# Patient Record
Sex: FEMALE | Race: WHITE | NOT HISPANIC OR LATINO | Employment: OTHER | URBAN - METROPOLITAN AREA
[De-identification: names, ages, dates, MRNs, and addresses within clinical notes are randomized per-mention and may not be internally consistent; named-entity substitution may affect disease eponyms.]

---

## 2023-08-08 ENCOUNTER — HOSPITAL ENCOUNTER (EMERGENCY)
Facility: HOSPITAL | Age: 78
Discharge: HOME | End: 2023-08-09
Attending: EMERGENCY MEDICINE | Admitting: EMERGENCY MEDICINE
Payer: MEDICARE

## 2023-08-08 ENCOUNTER — APPOINTMENT (OUTPATIENT)
Dept: CT IMAGING | Facility: HOSPITAL | Age: 78
End: 2023-08-08
Payer: MEDICARE

## 2023-08-08 ENCOUNTER — APPOINTMENT (OUTPATIENT)
Dept: GENERAL RADIOLOGY | Facility: HOSPITAL | Age: 78
End: 2023-08-08
Payer: MEDICARE
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
## 2023-08-08 DIAGNOSIS — I50.9 ACUTE CONGESTIVE HEART FAILURE, UNSPECIFIED HEART FAILURE TYPE: ICD-10-CM

## 2023-08-08 DIAGNOSIS — J44.1 COPD WITH ACUTE EXACERBATION: ICD-10-CM

## 2023-08-08 DIAGNOSIS — R07.9 CHEST PAIN, UNSPECIFIED TYPE: Primary | ICD-10-CM

## 2023-08-08 LAB
ALBUMIN SERPL-MCNC: 3.7 G/DL (ref 3.5–5.2)
ALBUMIN/GLOB SERPL: 1.4 G/DL
ALP SERPL-CCNC: 91 U/L (ref 39–117)
ALT SERPL W P-5'-P-CCNC: 114 U/L (ref 1–33)
ANION GAP SERPL CALCULATED.3IONS-SCNC: 11 MMOL/L (ref 5–15)
AST SERPL-CCNC: 85 U/L (ref 1–32)
BASOPHILS # BLD AUTO: 0.03 10*3/MM3 (ref 0–0.2)
BASOPHILS NFR BLD AUTO: 0.3 % (ref 0–1.5)
BILIRUB SERPL-MCNC: 0.7 MG/DL (ref 0–1.2)
BUN SERPL-MCNC: 10 MG/DL (ref 8–23)
BUN/CREAT SERPL: 13.9 (ref 7–25)
CALCIUM SPEC-SCNC: 9.3 MG/DL (ref 8.6–10.5)
CHLORIDE SERPL-SCNC: 98 MMOL/L (ref 98–107)
CO2 SERPL-SCNC: 29 MMOL/L (ref 22–29)
CREAT SERPL-MCNC: 0.72 MG/DL (ref 0.57–1)
DEPRECATED RDW RBC AUTO: 45.5 FL (ref 37–54)
EGFRCR SERPLBLD CKD-EPI 2021: 86.8 ML/MIN/1.73
EOSINOPHIL # BLD AUTO: 0.11 10*3/MM3 (ref 0–0.4)
EOSINOPHIL NFR BLD AUTO: 1.2 % (ref 0.3–6.2)
ERYTHROCYTE [DISTWIDTH] IN BLOOD BY AUTOMATED COUNT: 13.3 % (ref 12.3–15.4)
GEN 5 2HR TROPONIN T REFLEX: 44 NG/L
GLOBULIN UR ELPH-MCNC: 2.7 GM/DL
GLUCOSE SERPL-MCNC: 117 MG/DL (ref 65–99)
HCT VFR BLD AUTO: 34.9 % (ref 34–46.6)
HGB BLD-MCNC: 11.5 G/DL (ref 12–15.9)
HOLD SPECIMEN: NORMAL
HOLD SPECIMEN: NORMAL
IMM GRANULOCYTES # BLD AUTO: 0.03 10*3/MM3 (ref 0–0.05)
IMM GRANULOCYTES NFR BLD AUTO: 0.3 % (ref 0–0.5)
LIPASE SERPL-CCNC: 33 U/L (ref 13–60)
LYMPHOCYTES # BLD AUTO: 1.66 10*3/MM3 (ref 0.7–3.1)
LYMPHOCYTES NFR BLD AUTO: 17.7 % (ref 19.6–45.3)
MCH RBC QN AUTO: 31 PG (ref 26.6–33)
MCHC RBC AUTO-ENTMCNC: 33 G/DL (ref 31.5–35.7)
MCV RBC AUTO: 94.1 FL (ref 79–97)
MONOCYTES # BLD AUTO: 0.97 10*3/MM3 (ref 0.1–0.9)
MONOCYTES NFR BLD AUTO: 10.4 % (ref 5–12)
NEUTROPHILS NFR BLD AUTO: 6.57 10*3/MM3 (ref 1.7–7)
NEUTROPHILS NFR BLD AUTO: 70.1 % (ref 42.7–76)
NRBC BLD AUTO-RTO: 0 /100 WBC (ref 0–0.2)
NT-PROBNP SERPL-MCNC: 6818 PG/ML (ref 0–1800)
PLATELET # BLD AUTO: 251 10*3/MM3 (ref 140–450)
PMV BLD AUTO: 9.6 FL (ref 6–12)
POTASSIUM SERPL-SCNC: 4.4 MMOL/L (ref 3.5–5.2)
PROT SERPL-MCNC: 6.4 G/DL (ref 6–8.5)
RBC # BLD AUTO: 3.71 10*6/MM3 (ref 3.77–5.28)
SODIUM SERPL-SCNC: 138 MMOL/L (ref 136–145)
TROPONIN T DELTA: -2 NG/L
TROPONIN T SERPL HS-MCNC: 46 NG/L
WBC NRBC COR # BLD: 9.37 10*3/MM3 (ref 3.4–10.8)
WHOLE BLOOD HOLD COAG: NORMAL
WHOLE BLOOD HOLD SPECIMEN: NORMAL

## 2023-08-08 PROCEDURE — 71045 X-RAY EXAM CHEST 1 VIEW: CPT

## 2023-08-08 PROCEDURE — 80053 COMPREHEN METABOLIC PANEL: CPT | Performed by: EMERGENCY MEDICINE

## 2023-08-08 PROCEDURE — 93005 ELECTROCARDIOGRAM TRACING: CPT | Performed by: STUDENT IN AN ORGANIZED HEALTH CARE EDUCATION/TRAINING PROGRAM

## 2023-08-08 PROCEDURE — 85025 COMPLETE CBC W/AUTO DIFF WBC: CPT | Performed by: EMERGENCY MEDICINE

## 2023-08-08 PROCEDURE — 71250 CT THORAX DX C-: CPT

## 2023-08-08 PROCEDURE — 99284 EMERGENCY DEPT VISIT MOD MDM: CPT

## 2023-08-08 PROCEDURE — 36415 COLL VENOUS BLD VENIPUNCTURE: CPT

## 2023-08-08 PROCEDURE — 83880 ASSAY OF NATRIURETIC PEPTIDE: CPT | Performed by: EMERGENCY MEDICINE

## 2023-08-08 PROCEDURE — 96374 THER/PROPH/DIAG INJ IV PUSH: CPT

## 2023-08-08 PROCEDURE — 84484 ASSAY OF TROPONIN QUANT: CPT | Performed by: EMERGENCY MEDICINE

## 2023-08-08 PROCEDURE — 83690 ASSAY OF LIPASE: CPT | Performed by: EMERGENCY MEDICINE

## 2023-08-08 PROCEDURE — 93005 ELECTROCARDIOGRAM TRACING: CPT | Performed by: EMERGENCY MEDICINE

## 2023-08-08 RX ORDER — ASPIRIN 81 MG/1
324 TABLET, CHEWABLE ORAL ONCE
Status: COMPLETED | OUTPATIENT
Start: 2023-08-08 | End: 2023-08-08

## 2023-08-08 RX ORDER — SODIUM CHLORIDE 0.9 % (FLUSH) 0.9 %
10 SYRINGE (ML) INJECTION AS NEEDED
Status: DISCONTINUED | OUTPATIENT
Start: 2023-08-08 | End: 2023-08-09 | Stop reason: HOSPADM

## 2023-08-08 RX ADMIN — ASPIRIN 81 MG CHEWABLE TABLET 324 MG: 81 TABLET CHEWABLE at 21:00

## 2023-08-09 VITALS
TEMPERATURE: 98 F | OXYGEN SATURATION: 97 % | HEART RATE: 60 BPM | HEIGHT: 55 IN | DIASTOLIC BLOOD PRESSURE: 72 MMHG | BODY MASS INDEX: 24.49 KG/M2 | SYSTOLIC BLOOD PRESSURE: 155 MMHG | WEIGHT: 105.82 LBS | RESPIRATION RATE: 18 BRPM

## 2023-08-09 LAB — HOLD SPECIMEN: NORMAL

## 2023-08-09 PROCEDURE — 96374 THER/PROPH/DIAG INJ IV PUSH: CPT

## 2023-08-09 RX ORDER — FUROSEMIDE 40 MG/1
20 TABLET ORAL DAILY
Qty: 5 TABLET | Refills: 0 | Status: SHIPPED | OUTPATIENT
Start: 2023-08-09

## 2023-08-09 RX ORDER — BUMETANIDE 0.25 MG/ML
2 INJECTION INTRAMUSCULAR; INTRAVENOUS ONCE
Status: COMPLETED | OUTPATIENT
Start: 2023-08-09 | End: 2023-08-09

## 2023-08-09 RX ADMIN — BUMETANIDE 2 MG: 0.25 INJECTION, SOLUTION INTRAMUSCULAR; INTRAVENOUS at 01:00

## 2023-08-09 NOTE — ED PROVIDER NOTES
Subjective   History of Present Illness  77 year old female comes in by EMS complaining of chest pain. Her daughter told EMS the pain started 2 days ago when she fell in her home, and it radiates from her chest to her back. Her daughter stated her ankles started swelling, and expressed concern about her heart. Her baseline is normally confused. The patient's daughter has yet to arrive, and the patient cannot remember all of her medication or PMH. She notes a heart condition in the past but is unclear as to what. Patient lives alone, and it is unclear if she is able to successfully care for herself. Patient denies dizziness, N/V, or pain in extremities.     History provided by:  Patient, EMS personnel and relative  History limited by:  Dementia    Review of Systems   Constitutional:  Positive for fatigue.   Cardiovascular:  Positive for chest pain and leg swelling.   Musculoskeletal:  Positive for back pain. Negative for neck stiffness.   Neurological:  Negative for dizziness and syncope.     History reviewed. No pertinent past medical history.    No Known Allergies    Past Surgical History:   Procedure Laterality Date    JOINT REPLACEMENT         History reviewed. No pertinent family history.    Social History     Socioeconomic History    Marital status:    Tobacco Use    Smoking status: Former     Packs/day: 2.00     Years: 20.00     Pack years: 40.00     Types: Cigarettes     Quit date: 8/1/2013     Years since quitting: 10.0    Smokeless tobacco: Never   Vaping Use    Vaping Use: Never used   Substance and Sexual Activity    Alcohol use: Yes     Alcohol/week: 1.0 standard drink     Types: 1 Cans of beer per week    Drug use: Never    Sexual activity: Yes     Partners: Male           Objective   Physical Exam  Vitals and nursing note reviewed.   Cardiovascular:      Rate and Rhythm: Normal rate and regular rhythm.      Heart sounds: Normal heart sounds.   Pulmonary:      Effort: Pulmonary effort is normal.       Breath sounds: Normal breath sounds.   Musculoskeletal:      Right lower leg: Edema present.      Left lower leg: Edema present.   Skin:     General: Skin is warm.   Neurological:      Mental Status: She is alert.       Procedures           ED Course  ED Course as of 08/09/23 0258   Tue Aug 08, 2023   2358 XR Chest 1 View  Personally reviewed the single view the chest.  On my interpretation there is no focal lobar infiltrate [RS]   Wed Aug 09, 2023   0048 Patient is resting comfortably.  Patient is satting in the mid to upper 90s on 1 L via nasal cannula.  The patient does wear as needed oxygen at home.  Patient's second troponin is stable.  I will plan to discharge the patient with findings of likely COPD and CHF to follow-up as an outpatient with the heart failure clinic.  I talked with the patient and family about this findings and the plan.  Patient agrees that she does not want to stay in the hospital and does not feel that she needs to stay. I have reviewed results, considerations, and diagnosis with the patient and/or their representative. Anticipatory guidance provided. Follow-up plan reviewed. Precautions for acute return for re-evaluations also reviewed. This including potential for worsening of the presenting condition and need for further evaluation, admission, and/or intervention as indicated. Opportunity to as questions provided. I advised them to return for any concerns and stressed the importance of timely follow-up and outpatient services. They verbalized understanding.   [RS]      ED Course User Index  [RS] Mitch Salguero MD                      HEART Score: 5                      Medical Decision Making  Problems Addressed:  Acute congestive heart failure, unspecified heart failure type: complicated acute illness or injury  Chest pain, unspecified type: complicated acute illness or injury  COPD with acute exacerbation: complicated acute illness or injury  Fall, initial encounter:  complicated acute illness or injury    Amount and/or Complexity of Data Reviewed  Independent Historian: EMS  Labs: ordered.  Radiology: ordered. Decision-making details documented in ED Course.  ECG/medicine tests: ordered.    Risk  OTC drugs.  Prescription drug management.        Final diagnoses:   Chest pain, unspecified type   Fall, initial encounter   Acute congestive heart failure, unspecified heart failure type   COPD with acute exacerbation       ED Disposition  ED Disposition       ED Disposition   Discharge    Condition   Stable    Comment   --               [facility] C[facility]  [address]  [address]  [facility] [id number] [phone]    As soon as possible.    PATIENT CONNECTION - [place] [address]  [phone]  Schedule an appointment as soon as possible for a visit       [facility] EMERGENCY DEPARTMENT  [address] [address] [phone]    As needed, If symptoms worsen or ANY concerns.         Medication List        New Prescriptions      furosemide 40 MG tablet  Commonly known as: LASIX  Take 0.5 tablets by mouth Daily.               Where to Get Your Medications        These medications were sent to [facility] [id number] - [address] AT [facility] - [phone] [facility] [phone] [facility] [place] [id number]      Phone: [phone]   furosemide 40 MG tablet            Mitch Salguero MD  08/09/23 1587

## 2023-08-10 LAB
QT INTERVAL: 428 MS
QTC INTERVAL: 445 MS

## 2023-08-10 NOTE — PROGRESS NOTES
Chief Complaint  Chest Pain and Establish Care    Subjective      History of Present Illness {CC  Problem List  Visit  Diagnosis   Encounters  Notes  Medications  Labs  Result Review Imaging  Media :23}     Denisse Ortiz, 77 y.o. female with unknown past medical history, who presents to [facility] for Chest Pain and Establish Care  The patient presented to the ED on 8/8/2023 with complaints of chest pain.  Information provided to the ED was limited as patient has dementia and was a poor historian.  Patient was able to endorse chest pain after fall 2 days prior to presentation which radiated from her chest into her back.  Patient's daughter endorsed recent swelling to patient's feet and ankles.  BNP noted to be elevated at 6,818.  Chest x-ray showed severe chronic interstitial changes of the lung fields without any evidence of acute cardiopulmonary process.  CT scan of the chest showed no acute traumatic findings, severe emphysema present with mild dependent atelectasis and trace pleural effusions, there is also advanced multilevel thoracic spondylosis.  CBC showed mild anemia with hemoglobin of 11.5.  CMP showed mild transaminitis.  Patient was sent home with Lasix 20 mg daily.    Since time of evaluation in ED, she continues to have chest pain only with movement, cough, and deep breathing.  Family member present with patient today states this is likely related to fall which she had recent fall prior to eval to ED. Her shortness of air has much improved. She wears 2.5L NC at night and PRN during the day. Edema is significantly improved since addition of lasix.  She denies palpitations, syncope, near syncope, or fatigue. She is drinking plenty of water.  She is not checking blood pressure at home.      No known family history of cardiac disease  Quit smoking 6 years ago    Objective     Vital Signs:   Vitals:    08/11/23 1408 08/11/23 1411 08/11/23 1413 08/11/23 1517   BP: (!)  "192/88 175/81 (!) 191/88 170/72   BP Location: Right arm Left arm Left arm Left arm   Patient Position: Sitting Sitting Sitting    Cuff Size: Small Adult Small Adult Adult    Pulse: 64 64 64    Resp:   16    Temp:   97.4 øF (36.3 øC)    TempSrc:   Temporal    SpO2: 94% 93% 92%    Weight:   45.1 kg (99 lb 8 oz)    Height:   152.4 cm (60\")      Body mass index is 19.43 kg/mý.  Physical Exam  Vitals and nursing note reviewed.   Constitutional:       Appearance: Normal appearance.   HENT:      Head: Normocephalic.   Eyes:      Pupils: Pupils are equal, round, and reactive to light.   Cardiovascular:      Rate and Rhythm: Normal rate and regular rhythm.      Pulses: Normal pulses.      Heart sounds: Normal heart sounds. No murmur heard.  Pulmonary:      Effort: Pulmonary effort is normal.      Breath sounds: Normal breath sounds.   Abdominal:      General: Bowel sounds are normal.      Palpations: Abdomen is soft.   Musculoskeletal:         General: Normal range of motion.      Cervical back: Normal range of motion.      Right lower leg: No edema.      Left lower leg: No edema.   Skin:     General: Skin is warm and dry.      Capillary Refill: Capillary refill takes less than 2 seconds.   Neurological:      Mental Status: She is alert and oriented to person, place, and time.   Psychiatric:         Mood and Affect: Mood normal.         Thought Content: Thought content normal.              Data Reviewed:{ Labs  Result Review  Imaging  Med Tab  Media :23}     Lab Results   Component Value Date    WBC 9.37 08/08/2023    HGB 11.5 (L) 08/08/2023    HCT 34.9 08/08/2023    MCV 94.1 08/08/2023     08/08/2023      Lab Results   Component Value Date    GLUCOSE 117 (H) 08/08/2023    BUN 10 08/08/2023    CREATININE 0.72 08/08/2023    EGFR 86.8 08/08/2023    BCR 13.9 08/08/2023    K 4.4 08/08/2023    CO2 29.0 08/08/2023    CALCIUM 9.3 08/08/2023    ALBUMIN 3.7 08/08/2023    BILITOT 0.7 08/08/2023    AST 85 (H) 08/08/2023    "  (H) 08/08/2023        Lab Results   Component Value Date    TROPONINT 44 (H) 08/08/2023      CT Chest Without Contrast Diagnostic (08/08/2023 21:53)  XR Chest 1 View (08/08/2023 20:59)  ECG 12 Lead ED Triage Standing Order; Chest Pain (08/08/2023 20:34)     Assessment & Plan   Assessment and Plan {CC Problem List  Visit Diagnosis  ROS  Review (Popup)  Health Maintenance  Quality  BestPractice  Medications  SmartSets  SnapShot Encounters  Media :23}     1. Dyspnea on exertion  -Patient continues to have dyspnea with exertion which has been recently improved by Lasix  -We will obtain echocardiogram to assess for any structural abnormalities  -Informed patient that she may take Lasix as needed for now  -Educated patient on when to take Lasix and when to call clinic including weight gain of 3 pounds in 1 day or 5 pounds in 1 week, swelling in feet or ankles, or increase shortness of breath  -Patient encouraged to complete daily weights  - losartan (Cozaar) 25 MG tablet; Take 1 tablet by mouth Daily.  Dispense: 30 tablet; Refill: 3  - Adult Transthoracic Echo Complete W/ Cont if Necessary Per Protocol; Future  -Patient to follow-up in 3 weeks to discuss results of echocardiogram and assess blood pressure further, she will contact our office if she experiences difficulties before then    2.  Hypertension  -Recently patient was changed from verapamil to metoprolol  -Since that time, patient's blood pressure has been less controlled and remains uncontrolled  -We will add losartan 25 mg daily  -New prescription sent in for losartan and I instructed patient to start medication today  -Patient to begin ambulatory blood pressure monitoring  -She will contact our office if blood pressure continues to remain consistently over 140/90  -We will consider repeating lab work at time of next visit based on symptoms and medication adjustments    3.  Noncardiac chest pain  -Chest pain is now only occurring during  movement and is likely secondary to recent mechanical fall    Follow Up {Instructions Charge Capture  Follow-up Communications :23}     Return in about 3 weeks (around 9/1/2023) for Result review, Hypertension.      Patient was given instructions and counseling regarding her condition or for health maintenance advice. Please see specific information pulled into the AVS if appropriate.  Patient was instructed to call the [facility] with any questions, concerns, or worsening symptoms.    Dictated Utilizing Dragon Dictation   Please note that portions of this note were completed with a voice recognition program.   Part of this note may be an electronic transcription/translation of spoken language to printed text using the Dragon Dictation System.

## 2023-08-11 ENCOUNTER — OFFICE VISIT (OUTPATIENT)
Dept: CARDIOLOGY | Facility: HOSPITAL | Age: 78
End: 2023-08-11
Payer: MEDICARE

## 2023-08-11 VITALS
WEIGHT: 99.5 LBS | RESPIRATION RATE: 16 BRPM | HEIGHT: 60 IN | BODY MASS INDEX: 19.53 KG/M2 | OXYGEN SATURATION: 92 % | TEMPERATURE: 97.4 F | DIASTOLIC BLOOD PRESSURE: 72 MMHG | SYSTOLIC BLOOD PRESSURE: 170 MMHG | HEART RATE: 64 BPM

## 2023-08-11 DIAGNOSIS — I10 PRIMARY HYPERTENSION: ICD-10-CM

## 2023-08-11 DIAGNOSIS — R06.09 DYSPNEA ON EXERTION: Primary | ICD-10-CM

## 2023-08-11 RX ORDER — METHENAMINE HIPPURATE 1000 MG/1
1 TABLET ORAL 2 TIMES DAILY WITH MEALS
COMMUNITY
Start: 2023-07-20

## 2023-08-11 RX ORDER — DULOXETIN HYDROCHLORIDE 30 MG/1
30 CAPSULE, DELAYED RELEASE ORAL DAILY
COMMUNITY
Start: 2023-07-20

## 2023-08-11 RX ORDER — LOSARTAN POTASSIUM 25 MG/1
25 TABLET ORAL DAILY
Qty: 30 TABLET | Refills: 3 | Status: SHIPPED | OUTPATIENT
Start: 2023-08-11

## 2023-08-11 RX ORDER — HYDROXYZINE PAMOATE 50 MG/1
50 CAPSULE ORAL 2 TIMES DAILY PRN
COMMUNITY
Start: 2023-06-24

## 2023-08-11 RX ORDER — ALBUTEROL SULFATE 2.5 MG/3ML
2.5 SOLUTION RESPIRATORY (INHALATION) EVERY 6 HOURS PRN
COMMUNITY
Start: 2022-09-22 | End: 2023-09-22

## 2023-08-11 RX ORDER — PREDNISONE 10 MG/1
10 TABLET ORAL DAILY PRN
COMMUNITY
Start: 2023-07-08

## 2023-08-11 RX ORDER — METOPROLOL SUCCINATE 100 MG/1
100 TABLET, EXTENDED RELEASE ORAL DAILY
Qty: 30 TABLET | Refills: 11 | COMMUNITY
Start: 2023-07-24 | End: 2024-07-23

## 2023-08-11 RX ORDER — METHOCARBAMOL 750 MG/1
750 TABLET, FILM COATED ORAL 2 TIMES DAILY PRN
COMMUNITY
Start: 2023-07-27

## 2023-08-11 RX ORDER — ACETAMINOPHEN AND CODEINE PHOSPHATE 300; 60 MG/1; MG/1
2 TABLET ORAL 2 TIMES DAILY PRN
Qty: 240 TABLET | Refills: 1 | COMMUNITY
Start: 2023-07-31 | End: 2023-09-29

## 2023-08-11 RX ORDER — ZOLPIDEM TARTRATE 10 MG/1
5 TABLET ORAL NIGHTLY PRN
COMMUNITY
Start: 2023-07-31 | End: 2023-10-29

## 2023-08-11 RX ORDER — TIOTROPIUM BROMIDE 18 UG/1
1 CAPSULE ORAL; RESPIRATORY (INHALATION)
COMMUNITY
Start: 2023-07-24

## 2023-08-11 RX ORDER — PENTOXIFYLLINE 400 MG/1
400 TABLET, EXTENDED RELEASE ORAL 2 TIMES DAILY
COMMUNITY
Start: 2023-07-20

## 2023-08-11 NOTE — PATIENT INSTRUCTIONS
Please start checking weight and blood pressure DAILY    May stop taking lasix every day and may start using only as needed for extra fluid

## 2023-08-14 ENCOUNTER — HOSPITAL ENCOUNTER (OUTPATIENT)
Dept: CARDIOLOGY | Facility: HOSPITAL | Age: 78
Discharge: HOME | End: 2023-08-14
Admitting: NURSE PRACTITIONER
Payer: MEDICARE

## 2023-08-14 DIAGNOSIS — R06.09 DYSPNEA ON EXERTION: ICD-10-CM

## 2023-08-14 LAB
BH CV ECHO MEAS - AO MAX PG: 3.5 MMHG
BH CV ECHO MEAS - AO MEAN PG: 2.12 MMHG
BH CV ECHO MEAS - AO ROOT DIAM: 2.34 CM
BH CV ECHO MEAS - AO V2 MAX: 93.5 CM/SEC
BH CV ECHO MEAS - AO V2 VTI: 23.3 CM
BH CV ECHO MEAS - AVA(I,D): 2.23 CM2
BH CV ECHO MEAS - EDV(CUBED): 35.5 ML
BH CV ECHO MEAS - EDV(MOD-SP2): 31.7 ML
BH CV ECHO MEAS - EDV(MOD-SP4): 43.6 ML
BH CV ECHO MEAS - EF(MOD-BP): 76.9 %
BH CV ECHO MEAS - EF(MOD-SP2): 76.7 %
BH CV ECHO MEAS - EF(MOD-SP4): 76.8 %
BH CV ECHO MEAS - ESV(CUBED): 6.5 ML
BH CV ECHO MEAS - ESV(MOD-SP2): 7.4 ML
BH CV ECHO MEAS - ESV(MOD-SP4): 10.1 ML
BH CV ECHO MEAS - FS: 43.2 %
BH CV ECHO MEAS - IVS/LVPW: 1 CM
BH CV ECHO MEAS - IVSD: 0.73 CM
BH CV ECHO MEAS - LA DIMENSION: 3.2 CM
BH CV ECHO MEAS - LAT PEAK E' VEL: 4.6 CM/SEC
BH CV ECHO MEAS - LV DIASTOLIC VOL/BSA (35-75): 31.5 CM2
BH CV ECHO MEAS - LV MASS(C)D: 59.5 GRAMS
BH CV ECHO MEAS - LV MAX PG: 1.57 MMHG
BH CV ECHO MEAS - LV MEAN PG: 0.86 MMHG
BH CV ECHO MEAS - LV SYSTOLIC VOL/BSA (12-30): 7.3 CM2
BH CV ECHO MEAS - LV V1 MAX: 62.7 CM/SEC
BH CV ECHO MEAS - LV V1 VTI: 17.1 CM
BH CV ECHO MEAS - LVIDD: 3.3 CM
BH CV ECHO MEAS - LVIDS: 1.87 CM
BH CV ECHO MEAS - LVOT AREA: 3.1 CM2
BH CV ECHO MEAS - LVOT DIAM: 1.97 CM
BH CV ECHO MEAS - LVPWD: 0.72 CM
BH CV ECHO MEAS - MED PEAK E' VEL: 5.7 CM/SEC
BH CV ECHO MEAS - MV A MAX VEL: 40.8 CM/SEC
BH CV ECHO MEAS - MV DEC SLOPE: 634.5 CM/SEC2
BH CV ECHO MEAS - MV DEC TIME: 0.2 MSEC
BH CV ECHO MEAS - MV E MAX VEL: 105 CM/SEC
BH CV ECHO MEAS - MV E/A: 2.6
BH CV ECHO MEAS - MV MAX PG: 4.7 MMHG
BH CV ECHO MEAS - MV MEAN PG: 1.23 MMHG
BH CV ECHO MEAS - MV P1/2T: 49.5 MSEC
BH CV ECHO MEAS - MV V2 VTI: 24.3 CM
BH CV ECHO MEAS - MVA(P1/2T): 4.4 CM2
BH CV ECHO MEAS - MVA(VTI): 2.15 CM2
BH CV ECHO MEAS - PA ACC TIME: 0.11 SEC
BH CV ECHO MEAS - RAP SYSTOLE: 3 MMHG
BH CV ECHO MEAS - RVSP: 50.7 MMHG
BH CV ECHO MEAS - SI(MOD-SP2): 17.6 ML/M2
BH CV ECHO MEAS - SI(MOD-SP4): 24.2 ML/M2
BH CV ECHO MEAS - SV(LVOT): 52.1 ML
BH CV ECHO MEAS - SV(MOD-SP2): 24.3 ML
BH CV ECHO MEAS - SV(MOD-SP4): 33.5 ML
BH CV ECHO MEAS - TAPSE (>1.6): 1.83 CM
BH CV ECHO MEAS - TR MAX PG: 47.7 MMHG
BH CV ECHO MEAS - TR MAX VEL: 345.4 CM/SEC
BH CV ECHO MEASUREMENTS AVERAGE E/E' RATIO: 20.39
BH CV XLRA - RV BASE: 3.4 CM
BH CV XLRA - RV LENGTH: 4.7 CM
BH CV XLRA - RV MID: 2.7 CM
BH CV XLRA - TDI S': 7.7 CM/SEC
LEFT ATRIUM VOLUME INDEX: 20.2 ML/M2

## 2023-08-14 PROCEDURE — 93306 TTE W/DOPPLER COMPLETE: CPT

## 2023-08-14 PROCEDURE — 93306 TTE W/DOPPLER COMPLETE: CPT | Performed by: INTERNAL MEDICINE

## 2023-09-01 ENCOUNTER — OFFICE VISIT (OUTPATIENT)
Dept: CARDIOLOGY | Facility: HOSPITAL | Age: 78
End: 2023-09-01
Payer: MEDICARE

## 2023-09-01 VITALS
SYSTOLIC BLOOD PRESSURE: 186 MMHG | HEIGHT: 60 IN | WEIGHT: 99.38 LBS | OXYGEN SATURATION: 98 % | TEMPERATURE: 97.4 F | BODY MASS INDEX: 19.51 KG/M2 | DIASTOLIC BLOOD PRESSURE: 81 MMHG

## 2023-09-01 DIAGNOSIS — R93.1 ABNORMAL ECHOCARDIOGRAM: ICD-10-CM

## 2023-09-01 DIAGNOSIS — R06.09 DYSPNEA ON EXERTION: Primary | ICD-10-CM

## 2023-09-01 DIAGNOSIS — I10 PRIMARY HYPERTENSION: ICD-10-CM

## 2023-09-01 RX ORDER — SACUBITRIL AND VALSARTAN 24; 26 MG/1; MG/1
1 TABLET, FILM COATED ORAL 2 TIMES DAILY
Qty: 60 TABLET | Refills: 3 | Status: SHIPPED | OUTPATIENT
Start: 2023-09-01

## 2023-09-01 NOTE — PROGRESS NOTES
Chief Complaint  Follow-up    Subjective      History of Present Illness {CC  Problem List  Visit  Diagnosis   Encounters  Notes  Medications  Labs  Result Review Imaging  Media :23}     Denisse Ortiz, 77 y.o. female with unknown past medical history, who presents to [facility] for Follow-up  At time of last visit, patient was noted to be hypertensive.  She was added on losartan at that time.  He also completed echocardiogram at that time with results listed below.    Since previous office visit, patient has not started losartan.  Patient states that she did not fully understand what medication was for and thus did not start it.  Blood pressure at home according to patient is running 180 systolic.  Since time of evaluation by myself, patient did present to the ER at  for chest pain.  At that time, patient stated that she felt as though an elephant was sitting on her chest.  She ultimately left at  [facility] and stated she would like to go home.  When asked about this visit, patient did not recall this.    Since time of evaluation at  ED, she states chest pain is getting better. She has chronic shortness of air and wears 2 L of home oxygen.  Denies palpitations, syncope, or near syncope.  She endorses fatigue.      Echocardiogram 8/14/2023:    Left ventricular ejection fraction appears to be greater than 70%.    Left ventricular diastolic function is consistent with (grade III w/high LAP) reversible restrictive pattern.    Mild mitral valve regurgitation is present    Mild tricuspid valve regurgitation is present. Estimated right ventricular systolic pressure from tricuspid regurgitation is moderately elevated (45-55 mmHg).    Initial presentation:  The patient presented to the ED on 8/8/2023 with complaints of chest pain.  Information provided to the ED was limited as patient has dementia and was a poor historian.  Patient was able to endorse chest pain after fall 2 days prior to  "presentation which radiated from her chest into her back.  Patient's daughter endorsed recent swelling to patient's feet and ankles.  BNP noted to be elevated at 6,818.  Chest x-ray showed severe chronic interstitial changes of the lung fields without any evidence of acute cardiopulmonary process.  CT scan of the chest showed no acute traumatic findings, severe emphysema present with mild dependent atelectasis and trace pleural effusions, there is also advanced multilevel thoracic spondylosis.  CBC showed mild anemia with hemoglobin of 11.5.  CMP showed mild transaminitis.  Patient was sent home with Lasix 20 mg daily.    Since time of evaluation in ED, she continues to have chest pain only with movement, cough, and deep breathing.  Family member present with patient today states this is likely related to fall which she had recent fall prior to eval to ED. Her shortness of air has much improved. She wears 2.5L NC at night and PRN during the day. Edema is significantly improved since addition of lasix.  She denies palpitations, syncope, near syncope, or fatigue. She is drinking plenty of water.  She is not checking blood pressure at home.      No known family history of cardiac disease  Quit smoking 6 years ago    Objective     Vital Signs:   Vitals:    09/01/23 1345 09/01/23 1453   BP: (!) 207/80 (!) 186/81   BP Location: Left arm Left arm   Patient Position: Sitting    Cuff Size: Small Adult    Temp: 97.4 øF (36.3 øC)    TempSrc: Temporal    SpO2: 98%    Weight: 45.1 kg (99 lb 6 oz)    Height: 152.4 cm (60\")      Body mass index is 19.41 kg/mý.  Physical Exam  Vitals and nursing note reviewed.   Constitutional:       Appearance: Normal appearance.   HENT:      Head: Normocephalic.   Eyes:      Pupils: Pupils are equal, round, and reactive to light.   Cardiovascular:      Rate and Rhythm: Normal rate and regular rhythm.      Pulses: Normal pulses.      Heart sounds: Normal heart sounds. No murmur heard.  Pulmonary: "      Effort: Pulmonary effort is normal.      Breath sounds: Normal breath sounds.   Abdominal:      General: Bowel sounds are normal.      Palpations: Abdomen is soft.   Musculoskeletal:         General: Normal range of motion.      Cervical back: Normal range of motion.      Right lower leg: No edema.      Left lower leg: No edema.   Skin:     General: Skin is warm and dry.      Capillary Refill: Capillary refill takes less than 2 seconds.   Neurological:      Mental Status: She is alert and oriented to person, place, and time.   Psychiatric:         Mood and Affect: Mood normal.         Thought Content: Thought content normal.              Data Reviewed:{ Labs  Result Review  Imaging  Med Tab  Media :23}     Lab Results   Component Value Date    WBC 13.48 (H) 08/23/2023    HGB 13.0 08/23/2023    HCT 39.3 08/23/2023    MCV 93 08/23/2023     08/23/2023      Lab Results   Component Value Date    GLUCOSE 95 08/23/2023    BUN 10 08/08/2023    CREATININE 0.72 08/08/2023    EGFR 86.8 08/08/2023    BCR 13.9 08/08/2023    K 3.4 (L) 08/23/2023    CO2 29.0 08/08/2023    CALCIUM 9.3 08/08/2023    ALBUMIN 3.7 08/08/2023    BILITOT 0.7 08/08/2023    AST 85 (H) 08/08/2023     (H) 08/08/2023        Lab Results   Component Value Date    TROPONINT 20 (H) 08/23/2023      Adult Transthoracic Echo Complete W/ Cont if Necessary Per Protocol (08/14/2023 11:45)     CT Chest Without Contrast Diagnostic (08/08/2023 21:53)  XR Chest 1 View (08/08/2023 20:59)  ECG 12 Lead ED Triage Standing Order; Chest Pain (08/08/2023 20:34)     Assessment & Plan   Assessment and Plan {CC Problem List  Visit Diagnosis  ROS  Review (Popup)  Health Maintenance  Quality  BestPractice  Medications  SmartSets  SnapShot Encounters  Media :23}     1. Dyspnea on exertion  -Patient continues to be short of air which is chronic.  -Recent echocardiogram results discussed with patient  -She has not been taking Lasix  -Patient encouraged  to complete daily weights    2.  Hypertension  -Hypertension remains uncontrolled  -Patient did not start losartan at time of last visit.  She has not been taking Lasix  -Due to patient's abnormal echocardiogram, would like patient to start Entresto.  -Patient provided with Entresto 24/26 mg 28 tablets per bottle #2.  NDC: 8556-2819, Lot: [id number], expiration 4-25  -Patient to begin ambulatory blood pressure monitoring  -She was encouraged to contact our office if blood pressure continues to remain consistently over 140/90    3.  Chest pain  -Recently evaluated at  ER.  Patient left AMA prior to evaluation  -Chest pain has been ongoing requires further evaluation.  -We will obtain Lexiscan  -We will refer patient to cardiology    Follow Up {Instructions Charge Capture  Follow-up Communications :23}     Return if symptoms worsen or fail to improve.      Patient was given instructions and counseling regarding her condition or for health maintenance advice. Please see specific information pulled into the AVS if appropriate.  Patient was instructed to call the [facility] with any questions, concerns, or worsening symptoms.    Dictated Utilizing Dragon Dictation   Please note that portions of this note were completed with a voice recognition program.   Part of this note may be an electronic transcription/translation of spoken language to printed text using the Dragon Dictation System.

## 2023-09-01 NOTE — PATIENT INSTRUCTIONS
Start taking new medication today (Entesto), medicine should be taken twice a day.    Check blood pressure every day. Goal blood pressure less than 130/90.

## 2023-09-22 ENCOUNTER — HOSPITAL ENCOUNTER (OUTPATIENT)
Dept: CARDIOLOGY | Facility: HOSPITAL | Age: 78
Discharge: HOME | End: 2023-09-22
Payer: MEDICARE

## 2023-09-22 VITALS
DIASTOLIC BLOOD PRESSURE: 78 MMHG | BODY MASS INDEX: 19.52 KG/M2 | SYSTOLIC BLOOD PRESSURE: 128 MMHG | HEART RATE: 60 BPM | HEIGHT: 60 IN | WEIGHT: 99.43 LBS | OXYGEN SATURATION: 94 %

## 2023-09-22 DIAGNOSIS — I10 PRIMARY HYPERTENSION: ICD-10-CM

## 2023-09-22 DIAGNOSIS — R93.1 ABNORMAL ECHOCARDIOGRAM: ICD-10-CM

## 2023-09-22 DIAGNOSIS — R06.09 DYSPNEA ON EXERTION: ICD-10-CM

## 2023-09-22 PROCEDURE — 78452 HT MUSCLE IMAGE SPECT MULT: CPT

## 2023-09-22 PROCEDURE — 25010000002 REGADENOSON 0.4 MG/5ML SOLUTION: Performed by: NURSE PRACTITIONER

## 2023-09-22 PROCEDURE — 93017 CV STRESS TEST TRACING ONLY: CPT

## 2023-09-22 PROCEDURE — A9500 TC99M SESTAMIBI: HCPCS | Performed by: NURSE PRACTITIONER

## 2023-09-22 PROCEDURE — 0 TECHNETIUM SESTAMIBI: Performed by: NURSE PRACTITIONER

## 2023-09-22 RX ORDER — REGADENOSON 0.08 MG/ML
0.4 INJECTION, SOLUTION INTRAVENOUS ONCE
Status: COMPLETED | OUTPATIENT
Start: 2023-09-22 | End: 2023-09-22

## 2023-09-22 RX ADMIN — TECHNETIUM TC 99M SESTAMIBI 1 DOSE: 1 INJECTION INTRAVENOUS at 12:55

## 2023-09-22 RX ADMIN — TECHNETIUM TC 99M SESTAMIBI 1 DOSE: 1 INJECTION INTRAVENOUS at 11:20

## 2023-09-22 RX ADMIN — REGADENOSON 0.4 MG: 0.08 INJECTION, SOLUTION INTRAVENOUS at 12:51

## 2023-09-25 LAB
BH CV REST NUCLEAR ISOTOPE DOSE: 9.9 MCI
BH CV STRESS BP STAGE 2: NORMAL
BH CV STRESS BP STAGE 4: NORMAL
BH CV STRESS COMMENTS STAGE 1: NORMAL
BH CV STRESS DOSE REGADENOSON STAGE 1: 0.4
BH CV STRESS DURATION MIN STAGE 1: 1
BH CV STRESS DURATION MIN STAGE 2: 1
BH CV STRESS DURATION MIN STAGE 3: 1
BH CV STRESS DURATION MIN STAGE 4: 1
BH CV STRESS DURATION SEC STAGE 1: 10
BH CV STRESS DURATION SEC STAGE 2: 0
BH CV STRESS HR STAGE 1: 64
BH CV STRESS HR STAGE 2: 79
BH CV STRESS HR STAGE 3: 74
BH CV STRESS HR STAGE 4: 78
BH CV STRESS NUCLEAR ISOTOPE DOSE: 33 MCI
BH CV STRESS O2 STAGE 1: 98
BH CV STRESS O2 STAGE 2: 98
BH CV STRESS O2 STAGE 3: 99
BH CV STRESS O2 STAGE 4: 100
BH CV STRESS PROTOCOL 1: NORMAL
BH CV STRESS RECOVERY BP: NORMAL MMHG
BH CV STRESS RECOVERY HR: 68 BPM
BH CV STRESS RECOVERY O2: 99 %
BH CV STRESS STAGE 1: 1
BH CV STRESS STAGE 2: 2
BH CV STRESS STAGE 3: 3
BH CV STRESS STAGE 4: 4
LV EF NUC BP: 67 %
MAXIMAL PREDICTED HEART RATE: 144 BPM
PERCENT MAX PREDICTED HR: 56.25 %
STRESS BASELINE BP: NORMAL MMHG
STRESS BASELINE HR: 62 BPM
STRESS O2 SAT REST: 96 %
STRESS PERCENT HR: 66 %
STRESS POST ESTIMATED WORKLOAD: 1 METS
STRESS POST EXERCISE DUR MIN: 4 MIN
STRESS POST EXERCISE DUR SEC: 0 SEC
STRESS POST O2 SAT PEAK: 98 %
STRESS POST PEAK BP: NORMAL MMHG
STRESS POST PEAK HR: 81 BPM
STRESS TARGET HR: 122 BPM

## 2023-09-27 PROBLEM — I50.32 CHRONIC DIASTOLIC CONGESTIVE HEART FAILURE: Status: ACTIVE | Noted: 2023-09-27

## 2023-09-27 PROBLEM — I10 ESSENTIAL HYPERTENSION: Status: ACTIVE | Noted: 2023-09-27

## 2023-09-27 PROBLEM — I27.20 PULMONARY HYPERTENSION: Status: ACTIVE | Noted: 2023-09-27

## 2023-09-27 NOTE — PROGRESS NOTES
[facility] Cardiology New Patient Visit      Date: 2023  Patient Name: Denisse Ortiz  : 1945   MRN: [id number]     PCP: Darshana White MD   Referring Provider: Angie Mattue APRN     Chief Complaint: Hypertension, pulmonary hypertension    History of Present Illness  Denisse Ortiz is a 78 y.o. female  referred here by ADAM Pringle for evaluation of ongoing shortness of breath in the setting of diastolic heart failure and pulmonary hypertension.  Patient was evaluated at the heart valve clinic.  She had a stress test showing no myocardial perfusion defect and an echo that shows grade 3 diastolic dysfunction and an RVSP 45-55.  Patient was placed on Entresto her last visit after not taking Lasix and losartan as instructed before.  Patient and daughter here today.  Daughter does confirm patient is having some memory deficits and that is likely contributing to patient not taking her medications properly.  Patient no longer is having severe lower extremity edema or orthopnea.  She does use oxygen at night for her COPD but has needed to have oxygen intermittently during the day and used it yesterday.  Patient feels much better than she did about 2 months ago when she went to the hospital with chest discomfort and shortness of breath.  Patient has been tolerating the Entresto well.  Daughter is unsure if she is actually taking it daily at this point.  Patient also does not check her blood pressure on a daily basis.    Problem List        CARDIAC  Coronary Artery Disease:   Stress test 2023: No myocardial perfusion defect, coronary calcifications noted    Myocardium:   Echo 2023: EF greater than 70%, grade 3 diastolic dysfunction    Valvular:   Mild MR and TR, RVSP 45-55    Electrical:   Normal sinus rhythm    Percardium:   Normal      CARDIAC RISK FACTORS:         Hypertension         Dyslipidemia         Tobacco Use former smoker, quit in  2013      NON-CARDIAC:  Osteoporosis  Cognitive impairment (likely undiagnosed dementia)    SURGERIES:  Right hip replacement  Tonsillectomy  Bladder lift      Subjective     ROS   Systems reviewed and pertinent positives and negatives noted in the HPI.    Medications:     Current Outpatient Medications:     Apoaequorin (Prevagen) 10 MG capsule, Take 10 mg by mouth Daily., Disp: , Rfl:     Diclofenac Sodium (VOLTAREN) 1 % gel gel, Apply 4 g topically to the appropriate area as directed 4 (Four) Times a Day., Disp: , Rfl:     DULoxetine (CYMBALTA) 30 MG capsule, Take 1 capsule by mouth Daily., Disp: , Rfl:     hydrOXYzine pamoate (VISTARIL) 50 MG capsule, Take 1 capsule by mouth 2 (Two) Times a Day As Needed. With pain medication, Disp: , Rfl:     ipratropium-albuterol (COMBIVENT RESPIMAT)  MCG/ACT inhaler, Inhale 1 puff 2 (Two) Times a Day., Disp: , Rfl:     methenamine (HIPREX) 1 g tablet, Take 1 tablet by mouth 2 (Two) Times a Day With Meals., Disp: , Rfl:     methocarbamol (ROBAXIN) 750 MG tablet, Take 1 tablet by mouth 2 (Two) Times a Day As Needed., Disp: , Rfl:     metoprolol succinate XL (TOPROL-XL) 100 MG 24 hr tablet, Take 1 tablet by mouth Daily., Disp: 30 tablet, Rfl: 11    pentoxifylline (TRENtal) 400 MG CR tablet, Take 1 tablet by mouth 2 (Two) Times a Day., Disp: , Rfl:     predniSONE (DELTASONE) 10 MG tablet, Take 1 tablet by mouth Daily As Needed (arthritis)., Disp: , Rfl:     sacubitril-valsartan (Entresto) 24-26 MG tablet, Take 1 tablet by mouth 2 (Two) Times a Day., Disp: 60 tablet, Rfl: 3    Spiriva HandiHaler 18 MCG per inhalation capsule, Place 1 capsule into inhaler and inhale Daily., Disp: , Rfl:     zolpidem (AMBIEN) 10 MG tablet, Take 0.5 tablets by mouth At Night As Needed., Disp: , Rfl:     spironolactone (ALDACTONE) 25 MG tablet, Take 0.5 tablets by mouth Daily., Disp: 45 tablet, Rfl: 3     Allergies:   Allergies   Allergen Reactions    Cephalexin Other (See Comments)      "hallucination    Lactose Intolerance (Gi) Unknown (See Comments)         The following portions of the patient's history were reviewed and updated as appropriate: allergies, current medications, past family history, past medical history, past social history, past surgical history and problem list.    Objective     Vitals:    09/28/23 1314   BP: 148/66   BP Location: Left arm   Patient Position: Sitting   Cuff Size: Adult   Pulse: 63   SpO2: 93%   Weight: 46.3 kg (102 lb)   Height: 152.4 cm (60\")      Body mass index is 19.92 kg/m².     Physical Exam   Constitutional: Thin build  HENT: Normocephalic, atraumatic moist oral mucosa  Neck: Neck supple. No JVD present. No carotid bruits.   Cardiovascular: Regular rate, regular rhythm and normal heart sounds. No murmur heard.   Pulmonary/Chest: Prolonged expiratory phase.  Abdominal: Nondistended  Musculoskeletal: No obvious deformity  Neurological: Alert, poor memory  Extremities: No peripheral edema, palpable DP pulses bilaterally      Labs:  Lab Results   Component Value Date    GLUCOSE 95 08/23/2023    BUN 10 08/08/2023    CREATININE 0.72 08/08/2023    EGFRIFNONA 55 (L) 07/11/2022    EGFRIFAFRI >60 07/11/2022    BCR 13.9 08/08/2023    K 3.4 (L) 08/23/2023    CO2 29.0 08/08/2023    CALCIUM 9.3 08/08/2023    ALBUMIN 3.7 08/08/2023    AST 85 (H) 08/08/2023     (H) 08/08/2023     Lab Results   Component Value Date    WBC 13.48 (H) 08/23/2023    HGB 13.0 08/23/2023    HCT 39.3 08/23/2023    MCV 93 08/23/2023     08/23/2023     Lab Results   Component Value Date    TRIG 84 07/11/2022    HDL 64 07/11/2022    .2 (H) 07/11/2022     No results found for: TSH  Lab Results   Component Value Date    HGBA1C 4.9 11/18/2022     proBNP 8/23/2023: 3,193      ECG 12 Lead    Date/Time: 9/28/2023 2:29 PM  Performed by: Fior Paez MD  Authorized by: Fior Paez MD   Comparison: compared with previous ECG from 8/10/2023  Rhythm: sinus rhythm  BPM: " 63    Clinical impression: non-specific ECG        Smoking Cessation:   Patient no longer smokes    Advance Care Planning   ACP discussion was held with the patient during this visit. Patient does not have an advance directive, information provided.          Assessment / Plan    Assessment:  1. Pulmonary hypertension    2. Chronic diastolic congestive heart failure    3. Primary hypertension          Plan:  Start patient on spironolactone 12.5 mg daily as patient's blood pressure is not to goal.  We will need to check a BMP in 1 week to make sure patient is not having hyperkalemia.  Patient is to continue Entresto 24-26 for now for her blood pressure and heart failure.  She is to also continue metoprolol succinate 100 mg daily.  Had a discussion with the daughter about patient's memory deficit and the importance to follow-up with her PCP and/or a neurologist.  I think that the patient's memory deficit is limiting her ability to take medications properly and family is attempting as best they can to help the patient but likely will need more help and guidance.  Also patient can weigh herself daily and if she starts to have lower extremity swelling or worsening shortness of breath we can prescribe Lasix as needed.        Follow Up:   Return in about 6 months (around 3/28/2024).    Fior Paez MD      I have seen and examined the patient, case was discussed with the physician extender, reviewed the above note, necessary changes were made and I agree with the final note.   Fior Paez MD

## 2023-09-28 ENCOUNTER — OFFICE VISIT (OUTPATIENT)
Dept: CARDIOLOGY | Facility: CLINIC | Age: 78
End: 2023-09-28
Payer: MEDICARE

## 2023-09-28 VITALS
DIASTOLIC BLOOD PRESSURE: 66 MMHG | HEART RATE: 63 BPM | OXYGEN SATURATION: 93 % | HEIGHT: 60 IN | SYSTOLIC BLOOD PRESSURE: 148 MMHG | WEIGHT: 102 LBS | BODY MASS INDEX: 20.03 KG/M2

## 2023-09-28 DIAGNOSIS — I50.32 CHRONIC DIASTOLIC CONGESTIVE HEART FAILURE: ICD-10-CM

## 2023-09-28 DIAGNOSIS — I27.20 PULMONARY HYPERTENSION: Primary | ICD-10-CM

## 2023-09-28 DIAGNOSIS — I10 PRIMARY HYPERTENSION: ICD-10-CM

## 2023-09-28 PROBLEM — R41.89 COGNITIVE IMPAIRMENT: Status: ACTIVE | Noted: 2023-09-28

## 2023-09-28 RX ORDER — SPIRONOLACTONE 25 MG/1
12.5 TABLET ORAL DAILY
Qty: 45 TABLET | Refills: 3 | Status: SHIPPED | OUTPATIENT
Start: 2023-09-28

## 2023-09-28 NOTE — PATIENT INSTRUCTIONS
After starting spironolactone please check your BMP in 1 week.  This needs to be done to check your potassium to make sure that it is not elevated.

## 2023-11-13 NOTE — TELEPHONE ENCOUNTER
90 day Entresto refill requested. No Heart and Valve Clinic follow-up currently scheduled. Patient appears to be following with Dr Paez. Will forward request to [facility].

## 2023-11-15 ENCOUNTER — TELEPHONE (OUTPATIENT)
Dept: CARDIOLOGY | Facility: CLINIC | Age: 78
End: 2023-11-15
Payer: MEDICARE

## 2023-11-15 ENCOUNTER — LAB (OUTPATIENT)
Dept: LAB | Facility: HOSPITAL | Age: 78
End: 2023-11-15
Payer: MEDICARE

## 2023-11-15 DIAGNOSIS — I10 PRIMARY HYPERTENSION: ICD-10-CM

## 2023-11-15 LAB
ANION GAP SERPL CALCULATED.3IONS-SCNC: 8.4 MMOL/L (ref 5–15)
BUN SERPL-MCNC: 13 MG/DL (ref 8–23)
BUN/CREAT SERPL: 12.7 (ref 7–25)
CALCIUM SPEC-SCNC: 9.4 MG/DL (ref 8.6–10.5)
CHLORIDE SERPL-SCNC: 100 MMOL/L (ref 98–107)
CO2 SERPL-SCNC: 31.6 MMOL/L (ref 22–29)
CREAT SERPL-MCNC: 1.02 MG/DL (ref 0.57–1)
EGFRCR SERPLBLD CKD-EPI 2021: 56.8 ML/MIN/1.73
GLUCOSE SERPL-MCNC: 120 MG/DL (ref 65–99)
POTASSIUM SERPL-SCNC: 4 MMOL/L (ref 3.5–5.2)
SODIUM SERPL-SCNC: 140 MMOL/L (ref 136–145)

## 2023-11-15 PROCEDURE — 36415 COLL VENOUS BLD VENIPUNCTURE: CPT

## 2023-11-15 PROCEDURE — 80048 BASIC METABOLIC PNL TOTAL CA: CPT

## 2023-11-15 RX ORDER — SACUBITRIL AND VALSARTAN 24; 26 MG/1; MG/1
1 TABLET, FILM COATED ORAL 2 TIMES DAILY
Qty: 30 TABLET | Refills: 0 | Status: SHIPPED | OUTPATIENT
Start: 2023-11-15

## 2023-11-15 NOTE — TELEPHONE ENCOUNTER
Spoke with patient, gave patient 30 days on her Entresto, patient has to go get lab work done for anymore refills. Patient verbalizes understanding.

## 2023-11-16 ENCOUNTER — TELEPHONE (OUTPATIENT)
Dept: CARDIOLOGY | Facility: CLINIC | Age: 78
End: 2023-11-16
Payer: MEDICARE

## 2023-11-16 NOTE — TELEPHONE ENCOUNTER
----- Message from Shelly Basurto PA-C sent at 11/16/2023  8:26 AM EST -----  BMP reviewed.  Creatinine slightly more elevated but no changes need to be made at this time.  Patient does not have hyperkalemia.  She needs to remain on current medication regimen.

## 2024-03-04 RX ORDER — SACUBITRIL AND VALSARTAN 24; 26 MG/1; MG/1
1 TABLET, FILM COATED ORAL 2 TIMES DAILY
Qty: 180 TABLET | Refills: 3 | Status: SHIPPED | OUTPATIENT
Start: 2024-03-04

## 2024-03-27 NOTE — PROGRESS NOTES
Follow-up Visit      Date: 2024  Patient Name: Denisse Ortiz  : 1945   MRN: [id number]     Chief Complaint:    Chief Complaint   Patient presents with    Pulmonary hypertension    Dyspnea on exertion       History of Present Illness: Denisse Ortiz is a 78 y.o. female who is here today for follow-up test patient has been much much better for her heart failure.  She occasionally have shortness of breath but is mostly related to her COPD.  She denies any chest pain and lower extremity edema any weight loss or any weight gain.  She is still using oxygen off-and-on.  She is about to go to a cruise on Mother's Day.  She denies any black stools or any leg cramps or any other symptoms.      Problem List     CARDIAC  Coronary Artery Disease:   Stress test 2023: No myocardial perfusion defect, coronary calcifications noted    Myocardium:   Echo 2023: EF greater than 70%, G3DD    Valvular:   Mild MR and TR, RVSP 45-55    Electrical:   Normal sinus rhythm    Percardium:   Normal    CARDIAC RISK FACTORS:  Hypertension  Dyslipidemia  2023   HDL 71 LDL 97  Tobacco Use former smoker, quit in       NON-CARDIAC:  Osteoporosis  Cognitive impairment (likely undiagnosed dementia)    SURGERIES:  Right hip replacement  Tonsillectomy  Bladder lift      Subjective      Review of Systems:   Review of Systems   Respiratory:  Positive for chest tightness, shortness of breath and wheezing. Negative for apnea, cough, choking and stridor.    Cardiovascular:  Negative for chest pain, palpitations and leg swelling.       Medications:     Current Outpatient Medications:     acetaminophen-codeine (TYLENOL with CODEINE #4) 300-60 MG per tablet, Take 2 tablets by mouth 4 (Four) Times a Day., Disp: , Rfl:     alendronate (FOSAMAX) 70 MG tablet, Take 1 tablet by mouth., Disp: , Rfl:     Apoaequorin (Prevagen) 10 MG capsule, Take 10 mg by mouth Daily., Disp: , Rfl:     D-Mannose 500 MG capsule, Take  by  "mouth., Disp: , Rfl:     Diclofenac Sodium (VOLTAREN) 1 % gel gel, Apply 4 g topically to the appropriate area as directed 4 (Four) Times a Day., Disp: , Rfl:     DULoxetine (CYMBALTA) 30 MG capsule, Take 1 capsule by mouth Daily., Disp: , Rfl:     hydrOXYzine pamoate (VISTARIL) 50 MG capsule, Take 1 capsule by mouth 2 (Two) Times a Day As Needed. With pain medication, Disp: , Rfl:     methenamine (HIPREX) 1 g tablet, Take 1 tablet by mouth 2 (Two) Times a Day With Meals., Disp: , Rfl:     methocarbamol (ROBAXIN) 750 MG tablet, Take 1 tablet by mouth 2 (Two) Times a Day As Needed., Disp: , Rfl:     metoprolol succinate XL (TOPROL-XL) 100 MG 24 hr tablet, Take 1 tablet by mouth Daily., Disp: 30 tablet, Rfl: 11    pentoxifylline (TRENtal) 400 MG CR tablet, Take 1 tablet by mouth 2 (Two) Times a Day., Disp: , Rfl:     predniSONE (DELTASONE) 10 MG tablet, Take 1 tablet by mouth Daily As Needed (arthritis)., Disp: , Rfl:     sacubitril-valsartan (Entresto) 24-26 MG tablet, TAKE 1 TABLET BY MOUTH TWICE A DAY, Disp: 180 tablet, Rfl: 3    Spiriva HandiHaler 18 MCG per inhalation capsule, Place 1 capsule into inhaler and inhale Daily., Disp: , Rfl:     spironolactone (ALDACTONE) 25 MG tablet, Take 0.5 tablets by mouth Daily., Disp: 45 tablet, Rfl: 3    zolpidem (AMBIEN) 10 MG tablet, Take 1 tablet by mouth., Disp: , Rfl:     colchicine 0.6 MG tablet, , Disp: , Rfl:     ipratropium-albuterol (COMBIVENT RESPIMAT)  MCG/ACT inhaler, Inhale 1 puff 2 (Two) Times a Day., Disp: , Rfl:     Allergies:   Allergies   Allergen Reactions    Cephalexin Other (See Comments)     hallucination    Lactose Intolerance (Gi) Unknown (See Comments)       Objective     Physical Exam:  Vitals:    03/28/24 1142   BP: 116/58   BP Location: Left arm   Patient Position: Sitting   Cuff Size: Adult   Pulse: 64   SpO2: 94%   Weight: 48.6 kg (107 lb 3.2 oz)   Height: 152.4 cm (60\")     Body mass index is 20.94 kg/m².    Constitutional: Well-developed, " well-nourished, no acute distress  HENT: Normocephalic, atraumatic moist oral mucosa  Neck: Neck supple. No JVD present. No carotid bruits.   Cardiovascular: Regular rate, regular rhythm and normal heart sounds. No murmur heard.   Pulmonary/Chest: Patient is hardly moving any air today , without wheezing, rhonchi, or rails  Abdominal: Nondistended  Musculoskeletal: No obvious deformity  Neurological: Alert and oriented x3, no focal deficits  Extremities: No peripheral edema, palpable DP pulses bilaterally    Smoking Cessation:   Tobacco Product History : Patient quit smoking quit smoking in 2013 after 20 pack years     Lab Review:   Lab Results   Component Value Date    GLUCOSE 120 (H) 11/15/2023    BUN 13 11/15/2023    CREATININE 1.02 (H) 11/15/2023    EGFRIFNONA 55 (L) 07/11/2022    EGFRIFAFRI >60 07/11/2022    BCR 12.7 11/15/2023    K 4.0 11/15/2023    CO2 31.6 (H) 11/15/2023    CALCIUM 9.4 11/15/2023    ALBUMIN 3.7 08/08/2023    AST 85 (H) 08/08/2023     (H) 08/08/2023     Lab Results   Component Value Date    WBC 16.04 (H) 02/07/2024    HGB 13.5 02/07/2024    HCT 42.5 02/07/2024    MCV 94 02/07/2024     02/07/2024             ECG 12 Lead    Date/Time: 3/29/2024 4:47 PM  Performed by: Fior Paez MD    Authorized by: Fior Paez MD  Comparison: compared with previous ECG from 8/8/2023  Similar to previous ECG  Comparison to previous ECG: PVCs have cleared  Rhythm: sinus rhythm  QRS axis: left           Assessment / Plan      Assessment:   Diagnosis Plan   1. Chronic diastolic congestive heart failure  ECG 12 Lead      2. Essential hypertension             Plan:  Patient does not have to use diuretics for longtime.  She has been stable with her blood pressure and fluid status.  Will continue her on current medications.  I have advised her to use Lasix only on as needed basis.  She is not moving a whole lot air and is short of breath.  I have advised her to use her inhalers on a regular  basis.  She only uses when she has problem breathing.  She is going to be compliant with her medications.      Follow Up:       Return in about 1 year (around 3/28/2025).    Fior Paez MD

## 2024-03-28 ENCOUNTER — OFFICE VISIT (OUTPATIENT)
Dept: CARDIOLOGY | Facility: CLINIC | Age: 79
End: 2024-03-28
Payer: MEDICARE

## 2024-03-28 VITALS
WEIGHT: 107.2 LBS | HEIGHT: 60 IN | HEART RATE: 64 BPM | DIASTOLIC BLOOD PRESSURE: 58 MMHG | BODY MASS INDEX: 21.05 KG/M2 | SYSTOLIC BLOOD PRESSURE: 116 MMHG | OXYGEN SATURATION: 94 %

## 2024-03-28 DIAGNOSIS — I10 ESSENTIAL HYPERTENSION: ICD-10-CM

## 2024-03-28 DIAGNOSIS — I50.32 CHRONIC DIASTOLIC CONGESTIVE HEART FAILURE: Primary | ICD-10-CM

## 2024-03-28 RX ORDER — COLCHICINE 0.6 MG/1
TABLET ORAL
COMMUNITY
Start: 2024-01-12

## 2024-03-28 RX ORDER — ACETAMINOPHEN AND CODEINE PHOSPHATE 300; 60 MG/1; MG/1
2 TABLET ORAL 4 TIMES DAILY
COMMUNITY
Start: 2024-02-07

## 2024-03-28 RX ORDER — ZOLPIDEM TARTRATE 10 MG/1
10 TABLET ORAL
COMMUNITY
Start: 2024-02-07

## 2024-03-28 RX ORDER — ALENDRONATE SODIUM 70 MG/1
70 TABLET ORAL
COMMUNITY
Start: 2024-02-22

## 2024-10-07 RX ORDER — SPIRONOLACTONE 25 MG/1
12.5 TABLET ORAL DAILY
Qty: 45 TABLET | Refills: 3 | Status: SHIPPED | OUTPATIENT
Start: 2024-10-07

## 2024-10-09 ENCOUNTER — TELEPHONE (OUTPATIENT)
Dept: CARDIOLOGY | Facility: CLINIC | Age: 79
End: 2024-10-09
Payer: MEDICARE

## 2024-10-09 NOTE — PROGRESS NOTES
Follow-up Visit      Date: 10/10/2024  Patient Name: Denisse Ortiz  : 1945   MRN: [id number]     Chief Complaint:    Chief Complaint   Patient presents with    Chronic diastolic congestive heart failure       History of Present Illness: Denisse Ortiz is a 79 y.o. female who is here today for chronic diastolic dysfunction.  Patient is recovering slowly from her a lot of medical problems.  Her main problem is her breathing.  Sometimes she gets chest discomfort off and on but does not last long.  She has been short of breath most of the time and has been using oxygen for her COPD.  She can walk a little bit and her limitation factor is shortness of breath.      Problem List     CARDIAC  Coronary Artery Disease:   Stress test 2023: No myocardial perfusion defect, coronary calcifications noted    Myocardium:   Echo 2023: EF greater than 70%, G3DD    Valvular:   Mild MR and TR, RVSP 45-55    Electrical:   Normal sinus rhythm    Percardium:   Normal    CARDIAC RISK FACTORS:  Hypertension  Dyslipidemia  2023   HDL 71 LDL 97  Tobacco Use former smoker, quit in       NON-CARDIAC:  Osteoporosis  Cognitive impairment (likely undiagnosed dementia)    SURGERIES:  Right hip replacement  Tonsillectomy  Bladder lift      Subjective      Review of Systems:   Review of Systems   Respiratory:  Positive for cough and shortness of breath.    Cardiovascular: Negative.        Medications:     Current Outpatient Medications:     acetaminophen-codeine (TYLENOL with CODEINE #4) 300-60 MG per tablet, Take 2 tablets by mouth 4 (Four) Times a Day., Disp: , Rfl:     alendronate (FOSAMAX) 70 MG tablet, Take 1 tablet by mouth., Disp: , Rfl:     Apoaequorin (Prevagen) 10 MG capsule, Take 10 mg by mouth Daily., Disp: , Rfl:     colchicine 0.6 MG tablet, , Disp: , Rfl:     D-Mannose 500 MG capsule, Take  by mouth., Disp: , Rfl:     Diclofenac Sodium (VOLTAREN) 1 % gel gel, Apply 4 g topically to the  "appropriate area as directed 4 (Four) Times a Day., Disp: , Rfl:     DULoxetine (CYMBALTA) 30 MG capsule, Take 1 capsule by mouth Daily., Disp: , Rfl:     hydrOXYzine pamoate (VISTARIL) 50 MG capsule, Take 1 capsule by mouth 2 (Two) Times a Day As Needed. With pain medication, Disp: , Rfl:     methenamine (HIPREX) 1 g tablet, Take 1 tablet by mouth 2 (Two) Times a Day With Meals., Disp: , Rfl:     methocarbamol (ROBAXIN) 750 MG tablet, Take 1 tablet by mouth 2 (Two) Times a Day As Needed., Disp: , Rfl:     nystatin (MYCOSTATIN) 243491 UNIT/GM powder, Apply  topically to the appropriate area as directed As Needed., Disp: , Rfl:     pentoxifylline (TRENtal) 400 MG CR tablet, Take 1 tablet by mouth 2 (Two) Times a Day., Disp: , Rfl:     predniSONE (DELTASONE) 10 MG tablet, Take 1 tablet by mouth Daily As Needed (arthritis)., Disp: , Rfl:     sacubitril-valsartan (Entresto) 24-26 MG tablet, TAKE 1 TABLET BY MOUTH TWICE A DAY, Disp: 180 tablet, Rfl: 3    Spiriva HandiHaler 18 MCG per inhalation capsule, Place 1 capsule into inhaler and inhale Daily., Disp: , Rfl:     spironolactone (ALDACTONE) 25 MG tablet, Take 0.5 tablets by mouth Daily., Disp: 45 tablet, Rfl: 3    zolpidem (AMBIEN) 10 MG tablet, Take 1 tablet by mouth., Disp: , Rfl:     ipratropium-albuterol (COMBIVENT RESPIMAT)  MCG/ACT inhaler, Inhale 1 puff 2 (Two) Times a Day., Disp: , Rfl:     metoprolol succinate XL (TOPROL-XL) 100 MG 24 hr tablet, Take 1 tablet by mouth Daily., Disp: 30 tablet, Rfl: 11    Allergies:   Allergies   Allergen Reactions    Cephalexin Other (See Comments)     hallucination    Lactose Intolerance (Gi) Unknown (See Comments)       Objective     Physical Exam:  Vitals:    10/10/24 1213   BP: 128/60   BP Location: Right arm   Patient Position: Sitting   Pulse: 76   SpO2: 94%   Weight: 46.9 kg (103 lb 6.4 oz)   Height: 152.4 cm (60\")     Body mass index is 20.19 kg/m².    Constitutional:       General: Not in acute distress.     " Appearance: Healthy appearance. Not in distress.     Neck:     JVP:Not elevated     Carotid artery: Normal    Pulmonary:      Effort: Pulmonary effort is normal.      Breath sounds: Normal breath sounds. wheezing. No rhonchi. No rales.     Cardiovascular:      Normal rate. Regular rhythm. Normal S1. Normal S2.      Murmurs: There is no systolic murmur.      No gallop. No click. No rub.     Abdominal:      General: Bowel sounds are normal.      Palpations: Abdomen is soft.      Tenderness: There is no abdominal tenderness.    Extremities:     Pulses:Normal radial and pedal pulses     Edema:no edema    Smoking Cessation:   Tobacco Product History : Patient quit smoking many years ago     Lab Review:   Lab Results   Component Value Date    GLUCOSE 120 (H) 11/15/2023    BUN 13 11/15/2023    CREATININE 1.02 (H) 11/15/2023    EGFRIFNONA 55 (L) 07/11/2022    EGFRIFAFRI >60 07/11/2022    BCR 12.7 11/15/2023    K 4.0 11/15/2023    CO2 31.6 (H) 11/15/2023    CALCIUM 9.4 11/15/2023    ALBUMIN 3.7 08/08/2023    AST 85 (H) 08/08/2023     (H) 08/08/2023     Lab Results   Component Value Date    WBC 16.04 (H) 02/07/2024    HGB 13.5 02/07/2024    HCT 42.5 02/07/2024    MCV 94 02/07/2024     02/07/2024         Assessment / Plan      Assessment:   Diagnosis Plan   1. Chronic diastolic congestive heart failure        2. Essential hypertension             Plan:  Patient has been stable for her diastolic heart failure.  Patient is going to continue with her spironolactone and Entresto.  Her main problem is her COPD and most her symptoms are related to that.  Her right leg is also hurting but that is from the injury to her right hip.      Follow Up:       Return in about 1 year (around 10/10/2025).    Fior Paez MD

## 2024-10-09 NOTE — TELEPHONE ENCOUNTER
Overhead page from patient regarding SOB, the last few days she has not been able to walk or get out of bad and has had to use her oxygen all day long. Advised patient to go to ER.

## 2024-10-10 ENCOUNTER — OFFICE VISIT (OUTPATIENT)
Dept: CARDIOLOGY | Facility: CLINIC | Age: 79
End: 2024-10-10
Payer: MEDICAID

## 2024-10-10 VITALS
HEIGHT: 60 IN | BODY MASS INDEX: 20.3 KG/M2 | WEIGHT: 103.4 LBS | DIASTOLIC BLOOD PRESSURE: 60 MMHG | HEART RATE: 76 BPM | SYSTOLIC BLOOD PRESSURE: 128 MMHG | OXYGEN SATURATION: 94 %

## 2024-10-10 DIAGNOSIS — I10 ESSENTIAL HYPERTENSION: ICD-10-CM

## 2024-10-10 DIAGNOSIS — I50.32 CHRONIC DIASTOLIC CONGESTIVE HEART FAILURE: Primary | ICD-10-CM

## 2024-10-10 RX ORDER — NYSTATIN 100000 [USP'U]/G
POWDER TOPICAL AS NEEDED
COMMUNITY
Start: 2024-04-19

## 2025-03-18 RX ORDER — SACUBITRIL AND VALSARTAN 24; 26 MG/1; MG/1
1 TABLET, FILM COATED ORAL 2 TIMES DAILY
Qty: 180 TABLET | Refills: 3 | Status: SHIPPED | OUTPATIENT
Start: 2025-03-18